# Patient Record
Sex: MALE | Race: WHITE | NOT HISPANIC OR LATINO | URBAN - METROPOLITAN AREA
[De-identification: names, ages, dates, MRNs, and addresses within clinical notes are randomized per-mention and may not be internally consistent; named-entity substitution may affect disease eponyms.]

---

## 2019-09-16 DIAGNOSIS — N52.9 ERECTILE DYSFUNCTION, UNSPECIFIED ERECTILE DYSFUNCTION TYPE: ICD-10-CM

## 2019-09-16 DIAGNOSIS — F32.A DEPRESSIVE DISORDER: Primary | ICD-10-CM

## 2019-09-16 DIAGNOSIS — F40.10 SOCIAL ANXIETY DISORDER: ICD-10-CM

## 2019-09-16 PROCEDURE — 90834 PR PSYCHOTHERAPY W/PATIENT, 45 MIN: ICD-10-PCS | Mod: HP,HB,S$GLB, | Performed by: PSYCHOLOGIST

## 2019-09-16 PROCEDURE — 90834 PSYTX W PT 45 MINUTES: CPT | Mod: HP,HB,S$GLB, | Performed by: PSYCHOLOGIST

## 2019-09-17 NOTE — PROGRESS NOTES
"[facility]  [address]  [place], [place]  [phone]    Individual Psychotherapy (PhD)    Name: Hao Monaco  /AGE: 1972, 45 y/o  DATE: 2019    CPT code: 34324    Therapeutic Intervention:  Patient was seen this date for individual psychotherapy using cognitive-behavioral intervention. Patient is an existing patient that has seen Dr. Phyllis Louis at [facility] from 2018 to 2019 for a total of 12 sessions, and is in her continued care from this date forward at [facility].    Chief complaint/reason for encounter  F32.9 Depressive Disorder  F40.10 Social Anxiety Disorder  N52.9 Erectile Dysfunction, psychogenic    Interval history and content of current session:  Patient has not been seen for 2 months during Dr. Louis's move from [facility] to [facility].  Session today was aimed at reviewing current progress and assessing future plans. Patient is moving to [facility] at the end of October, so goal is to prepare patient with skills to continue progress on his own until a new therapist is established, if needed, in his new location. Session consisted of: Review of progress on previous homework of identifying unrealistic automatic thoughts about his inadequacies and limitations; discussion of self-care by working on boundaries with damaging relationships; progress with anger management and acceptance of others without attributing self-blame; identification of "healthy relationships."    Treatment Plan:   1. Begin prescription for Lexapro 20 mg from PCP for depressive disorder.  2. Cognitive Restructuring to replace unrealistci and catastrophic beliefs with realistic self-statements.  3. Assertiveness Training to decrease social introversion.  4. Communication skills training to improve confidence with ability to " "communicate with others without anxiety.  5. Relaxation exercises (mindfulness, diaphramatic breathing, meditation) to decrease SNS activity.  6. Graduated Exposure exercises to increase social contact.  7. Identify and increase pleasurable activities versus social isolation.  8. Appointment with PCP for prescription for Viagra to increase confidence.    Homework:  Patient's homework has been to identify and write down automatic/catastrophic self-statements. He provided several, however, therapist was unable to dissuade him with traditional cognitive-behavioral techniques from some overvalued ideation about his body and sexual attractiveness to women. Will begin with graduated exposure exercises at next visit to assess if more effective in his treatment.    Progress toward goals:   Patient has been compliant with taking prescription of Lexapro 20 mg and reports "improved mood" with some increased sleepiness. Recommended that he take medication at night. He has ended a significant relationship after 2 and a half months, but reports that he views this as progress given excessive miscommunication, arguing and manipulation from her. The couple had been previously involved, with the same conflicts. Focused on communication skills and identifying nurturing relationships.    Return to clinic: in 1 week.    Length of Service (minutes): 50 minutes               "

## 2019-10-07 DIAGNOSIS — N52.9 ERECTILE DYSFUNCTION, UNSPECIFIED ERECTILE DYSFUNCTION TYPE: ICD-10-CM

## 2019-10-07 DIAGNOSIS — F32.A DEPRESSIVE DISORDER: Primary | ICD-10-CM

## 2019-10-07 DIAGNOSIS — F40.10 SOCIAL ANXIETY DISORDER: ICD-10-CM

## 2019-10-07 DIAGNOSIS — F45.22 BODY DYSMORPHIC DISORDER: ICD-10-CM

## 2019-10-07 PROCEDURE — 90834 PSYTX W PT 45 MINUTES: CPT | Mod: HP,HB,S$GLB, | Performed by: PSYCHOLOGIST

## 2019-10-07 PROCEDURE — 90834 PR PSYCHOTHERAPY W/PATIENT, 45 MIN: ICD-10-PCS | Mod: HP,HB,S$GLB, | Performed by: PSYCHOLOGIST

## 2019-10-08 NOTE — PROGRESS NOTES
"[facility]  [address]  [place], [place]  [phone]    Individual Psychotherapy (PhD)    Name: Hao Monaco DOB/AGE: 1972, 47 y/o  DATE: 2019    CPT code: 01139    Therapeutic Intervention:  Patient was seen this date for continued individual psychotherapy using cognitive-behavioral intervention.    Chief complaint/reason for encounter  F32.9 Depressive Disorder  F40.10 Social Anxiety Disorder  N52.9 Erectile Dysfunction, psychogenic  Body Dysmorphobic Disorder    Interval history and content of current session:  Patient was seen for individual psychotherapy 3 weeks ago.  Session today was aimed at reviewing current progress and assessing future plans. Patient is moving to [facility] at the end of October, so goal is to prepare patient with skills to continue progress on his own until a new therapist is established, if needed, in his new location. Session consisted of: Review of progress on previous homework of identifying unrealistic automatic thoughts about his inadequacies and limitations; discussion of self-care by working on boundaries with damaging relationships; progress with anger management and acceptance of others without attributing self-blame; identification of "healthy relationships."    Treatment Plan:   1. Continue prescription for Lexapro 20 mg from PCP for depressive disorder.  2. Cognitive Restructuring to replace unrealistic and catastrophic beliefs with realistic self-statements.  3. Assertiveness Training to decrease social introversion.  4. Communication skills training to improve confidence with ability to communicate with others without anxiety.  5. Relaxation exercises (mindfulness, diaphramatic breathing, meditation) to decrease SNS activity.  6. Graduated Exposure exercises to increase social contact.  7. Identify and increase pleasurable activities versus social isolation.  8. Appointment with PCP " "for prescription for Viagra to increase confidence.    Homework:  Patient's homework has been to identify and write down automatic/catastrophic self-statements. He provided several, however, therapist was unable to dissuade him with traditional cognitive-behavioral techniques from some overvalued ideation about his body and sexual attractiveness to women. Will begin with graduated exposure exercises at next visit to assess if more effective in his treatment.    Progress toward goals:   Patient has been compliant with taking prescription of Lexapro 20 mg and reports "improved mood" and no anxiety. However patient is quite aggitated and noncompliant. He is irritable that he does not feel that he is making progress. Patient has body dysmorphobic disorder that has become more prominent in his clinical picture, and overvalued ideation that his penis size is the cause for his social anxiety and inability to talk with women, which consequently leads to his performance anxiety and ED. He cannot be dissuaded with normal Cognitive Restructuring or Cognitive-Behavioral Techniques. The antidepressants have also been ineffective in changing his belief system. He refused graduated exposure to increase social contact. Recommended return to Urologist for further assessment and referral to Psychiatry for additional evaluation of medication, but since patient is leaving to [facility] in 3 weeks, he refused this also. Offered patient to return to clinic for a close-out session prior to his departure and I will follow-up with any additional suggestions.      Return to clinic: 3 weeks    Length of Service (minutes): 50 minutes               "

## 2019-10-15 DIAGNOSIS — F40.10 SOCIAL ANXIETY DISORDER: ICD-10-CM

## 2019-10-15 DIAGNOSIS — F45.22 BODY DYSMORPHIC DISORDER: ICD-10-CM

## 2019-10-15 DIAGNOSIS — N52.9 ERECTILE DYSFUNCTION, UNSPECIFIED ERECTILE DYSFUNCTION TYPE: ICD-10-CM

## 2019-10-15 DIAGNOSIS — F32.A DEPRESSIVE DISORDER: Primary | ICD-10-CM

## 2019-10-15 PROCEDURE — 90834 PR PSYCHOTHERAPY W/PATIENT, 45 MIN: ICD-10-PCS | Mod: HP,HB,S$GLB, | Performed by: PSYCHOLOGIST

## 2019-10-15 PROCEDURE — 90834 PSYTX W PT 45 MINUTES: CPT | Mod: HP,HB,S$GLB, | Performed by: PSYCHOLOGIST

## 2019-10-15 NOTE — PROGRESS NOTES
"[facility]  [address]  [place], [place]  [phone]    Individual Psychotherapy (PhD)    Name: Hao Monaco DOB/AGE: 1972, 45 y/o  DATE: October 15, 2019    CPT code: 66323    Therapeutic Intervention:  Patient was seen this date for continued individual psychotherapy using cognitive-behavioral intervention.    Chief complaint/reason for encounter  F32.9 Depressive Disorder  F40.10 Social Anxiety Disorder  N52.9 Erectile Dysfunction, psychogenic  Body Dysmorphobic Disorder    Interval history and content of current session:  Patient was seen for individual psychotherapy 1 week ago.  Content of today's session:  1. Address patient's frustration and low compliance or expectancy for change in therapy  2. Review treatment options and pursue treatment plan with energy and positive expectancy  4. Give homework    Treatment Plan:   1. Continue prescription for Lexapro 20 mg from PCP for depressive disorder.  2. Cognitive Restructuring to replace unrealistic and catastrophic beliefs with realistic self-statements.  3. Assertiveness Training to decrease social introversion.  4. Communication skills training to improve confidence with ability to communicate with others without anxiety.  5. Relaxation exercises (mindfulness, diaphramatic breathing, meditation) to decrease SNS activity.  6. Graduated Exposure exercises to increase social contact.  7. Identify and increase pleasurable activities versus social isolation.  8. Appointment with PCP for prescription for Viagra to increase confidence.    Progress toward goals:   Patient has been compliant with taking prescription of Lexapro 20 mg and continues to report "improved mood" and no anxiety. However, patient continues to be "frustrated" that he does not feel that he is making progress. Patient has body dysmorphobic disorder that has become more prominent in his clinical picture, and overvalued ideation that his " "penis size is the cause for his social anxiety and inability to talk with women, which consequently leads to his performance anxiety and ED. He cannot be dissuaded with normal Cognitive Restructuring or Cognitive-Behavioral Techniques. The antidepressants have also been ineffective in changing his belief system. Session focus was on reviewing options for treatment and rationale.  Discussed:  1. Cognitive-Restructuring to challenge unrealistic beliefs (not effective)  2. Referral to Psychiatry to evaluate alternate medications to change overvalued ideation  3. Referral to Urologist for Viagra or Cialis to increase confidence of erection  4. Graduated Exposure exercised to decrease anxiety in speaking with women  Patient refused all options except the social exposure exercise.     Homework:  Patient will begin Graduated Exposure exercise with very low level social contact. When walking past a women in the alonzo, in an elevator, etc. he will make a simple polite greeting or comment ("Good-morning," "Have a nice evening" "Can I get the door for you?" etc.) He will do this at least 5x before the next session. Patient feels this homework assignment is difficult, but agreed to try to complete it. Will review at next session and assess SUDS rating of anxiety level.    Return to clinic: 1 week    Length of Service (minutes): 45 minutes               "

## 2019-10-30 DIAGNOSIS — F32.A DEPRESSIVE DISORDER: Primary | ICD-10-CM

## 2019-10-30 DIAGNOSIS — N52.9 ERECTILE DYSFUNCTION, UNSPECIFIED ERECTILE DYSFUNCTION TYPE: ICD-10-CM

## 2019-10-30 DIAGNOSIS — F40.10 SOCIAL ANXIETY DISORDER: ICD-10-CM

## 2019-10-30 DIAGNOSIS — F45.22 BODY DYSMORPHIC DISORDER: ICD-10-CM

## 2019-10-30 PROCEDURE — 90834 PR PSYCHOTHERAPY W/PATIENT, 45 MIN: ICD-10-PCS | Mod: HP,HB,S$GLB, | Performed by: PSYCHOLOGIST

## 2019-10-30 PROCEDURE — 90834 PSYTX W PT 45 MINUTES: CPT | Mod: HP,HB,S$GLB, | Performed by: PSYCHOLOGIST

## 2019-10-30 NOTE — PROGRESS NOTES
"[facility]  [address]  [place], [place]  [phone]    Individual Psychotherapy (PhD)    Name: Hao Monaco DOB/AGE: 1972, 48 y/o  DATE: 2019    CPT code: 02068    Therapeutic Intervention:  Patient was seen this date for continued individual psychotherapy using cognitive-behavioral intervention.    Chief complaint/reason for encounter  F32.9 Depressive Disorder  F40.10 Social Anxiety Disorder  N52.9 Erectile Dysfunction, psychogenic  Body Dysmorphobic Disorder    Interval history and content of current session:  Patient was seen for individual psychotherapy 2 weeks ago.  Content of today's session:  1. Address patient's frustration and low compliance or expectancy for change in therapy  2. Review treatment options and pursue treatment plan with energy and positive expectancy  3. Review MMPI-2 results from previous testing at [facility]  4. Review homework    Treatment Plan:   1. Continue prescription for Lexapro 20 mg from PCP for depressive disorder.  2. Cognitive Restructuring to replace unrealistic and catastrophic beliefs with realistic self-statements.  3. Assertiveness Training to decrease social introversion.  4. Communication skills training to improve confidence with ability to communicate with others without anxiety.  5. Relaxation exercises (mindfulness, diaphramatic breathing, meditation) to decrease SNS activity.  6. Graduated Exposure exercises to increase social contact.  7. Identify and increase pleasurable activities versus social isolation.  8. Appointment with PCP for prescription for Viagra to increase confidence.    Progress toward goals:   Patient has been compliant with taking prescription of Lexapro 20 mg and continues to report "improved mood" and no anxiety. However, patient continues to be "frustrated" that he does not feel that he is making progress. Patient has body dysmorphobic disorder that " "has become more prominent in his clinical picture, and overvalued ideation that his penis size is the cause for his social anxiety and inability to talk with women, which consequently leads to his performance anxiety and ED. He cannot be dissuaded with normal Cognitive Restructuring or Cognitive-Behavioral Techniques. The antidepressants have also been ineffective in changing his belief system. He was compliant with last session's graduated exposure exercise to speak to 5 women with very low level of social contact  ("Hi," Can I get the door for you?," etc.) for social anxiety. He was able to approach and talk with 5 women with moderate anxiety (SUDS = 5), but he states that the problem is if it were to go any further because he is afraid for anyone to see his body which he believes to be inferior. Reviewed his MMPI with him again which shows clinically significant depresssed mood, social introversion, anxiety, and mild paranoia. Discussed with patient rationale for treatment options:    1. Cognitive-Restructuring to challenge unrealistic beliefs (not effective)  2. Referral to Psychiatry to evaluate alternate medications to change overvalued ideation   3. Referral to Urologist for Viagra or Cialis to increase confidence of erection  4. Graduated Exposure exercised to decrease anxiety in speaking with women  Patient refused all options.     Homework:  Patient wants to discontinue graduated exposure exercises. Discussed possibility of bringing in his ex-girlfriend at next session to gather information from her as it relates to the patient's mental status and behavior from an outside perspective. Patient will inform me if she agrees to attend.    Return to clinic: 1 week    Length of Service (minutes): 45 minutes               "